# Patient Record
Sex: FEMALE | Race: BLACK OR AFRICAN AMERICAN | NOT HISPANIC OR LATINO | Employment: FULL TIME | URBAN - METROPOLITAN AREA
[De-identification: names, ages, dates, MRNs, and addresses within clinical notes are randomized per-mention and may not be internally consistent; named-entity substitution may affect disease eponyms.]

---

## 2020-05-11 ENCOUNTER — DOCUMENTATION ONLY (OUTPATIENT)
Dept: ALLERGY | Facility: CLINIC | Age: 35
End: 2020-05-11

## 2020-05-11 DIAGNOSIS — Z91.199 NO-SHOW FOR APPOINTMENT: Primary | ICD-10-CM

## 2020-05-11 NOTE — PROGRESS NOTES
No show initial appt 5/11/2020.        Jacque Zamora M.D.  Allergy/Immunology  [facility]   [phone] phone  [phone] fax

## 2020-07-27 ENCOUNTER — OFFICE VISIT (OUTPATIENT)
Dept: PODIATRY | Facility: CLINIC | Age: 35
End: 2020-07-27
Payer: COMMERCIAL

## 2020-07-27 VITALS — DIASTOLIC BLOOD PRESSURE: 79 MMHG | HEART RATE: 83 BPM | SYSTOLIC BLOOD PRESSURE: 159 MMHG | HEIGHT: 61 IN

## 2020-07-27 DIAGNOSIS — L60.8 MELANONYCHIA: Primary | ICD-10-CM

## 2020-07-27 PROCEDURE — 99202 OFFICE O/P NEW SF 15 MIN: CPT | Mod: S$GLB,,, | Performed by: PODIATRIST

## 2020-07-27 PROCEDURE — 99999 PR PBB SHADOW E&M-NEW PATIENT-LVL III: CPT | Mod: PBBFAC,,, | Performed by: PODIATRIST

## 2020-07-27 PROCEDURE — 99999 PR PBB SHADOW E&M-NEW PATIENT-LVL III: ICD-10-PCS | Mod: PBBFAC,,, | Performed by: PODIATRIST

## 2020-07-27 PROCEDURE — 99202 PR OFFICE/OUTPT VISIT, NEW, LEVL II, 15-29 MIN: ICD-10-PCS | Mod: S$GLB,,, | Performed by: PODIATRIST

## 2020-07-27 RX ORDER — HYDROCHLOROTHIAZIDE 12.5 MG/1
12.5 TABLET ORAL
COMMUNITY

## 2020-07-27 RX ORDER — ALBUTEROL SULFATE 90 UG/1
1 AEROSOL, METERED RESPIRATORY (INHALATION) EVERY 4 HOURS PRN
COMMUNITY
Start: 2020-06-05

## 2020-07-27 RX ORDER — GLYCOPYRRONIUM 2.4 G/100G
CLOTH TOPICAL
COMMUNITY
Start: 2020-05-05

## 2020-07-27 RX ORDER — OMEPRAZOLE 40 MG/1
CAPSULE, DELAYED RELEASE ORAL
COMMUNITY
Start: 2020-06-05

## 2020-07-27 NOTE — PATIENT INSTRUCTIONS
"  Longitudinal melanonychia, also called "melanonychia striata," describes a pigmented, brown to black, longitudinal streak of the nail plate due to increased activity of melanocytes or melanocytic hyperplasia in the nail matrix, with increased melanin deposition in the nail plate.     The most common type of longitudinal melanonychia due to melanocytic activation is ethnic melanonychia, which typically presents in dark-skinned individuals as multiple bands affecting multiple nails.       General nail care measures for abnormal nails include:    ? Keeping nails trimmed short    ? Avoiding trauma     ? Avoiding contact irritants     ? Keeping nails dry (avoiding wet work)    ? Avoiding all nail cosmetics     "

## 2020-07-27 NOTE — PROGRESS NOTES
Chief Complaint   Patient presents with    Nail Problem           HPI:   Marck Newell is a 34 y.o. female who presents to clinic with complaints of discolored toenails.  Patient states nails have been abnormal since at least 6 months.  Slowly getting worse.  However there are no wounds.  She reports no pain to the area.  She did start a new job about a year ago.  She wore clogs to work.  They were little tight so she stopped wearing them.  No self-treatment.        There is no problem list on file for this patient.          Current Outpatient Medications on File Prior to Visit   Medication Sig Dispense Refill    albuterol (PROVENTIL/VENTOLIN HFA) 90 mcg/actuation inhaler 1 puff every 4 (four) hours as needed.      hydroCHLOROthiazide (HYDRODIURIL) 12.5 MG Tab Take 12.5 mg by mouth.      omeprazole (PRILOSEC) 40 MG capsule       QBREXZA 2.4 % Towl        No current facility-administered medications on file prior to visit.             Review of patient's allergies indicates:  No Known Allergies        Social History     Socioeconomic History    Marital status: Single     Spouse name: Not on file    Number of children: Not on file    Years of education: Not on file    Highest education level: Not on file   Occupational History    Not on file   Social Needs    Financial resource strain: Not on file    Food insecurity     Worry: Not on file     Inability: Not on file    Transportation needs     Medical: Not on file     Non-medical: Not on file   Tobacco Use    Smoking status: Never Smoker    Smokeless tobacco: Never Used   Substance and Sexual Activity    Alcohol use: Not Currently    Drug use: Not on file    Sexual activity: Not Currently   Lifestyle    Physical activity     Days per week: Not on file     Minutes per session: Not on file    Stress: Not on file   Relationships    Social connections     Talks on phone: Not on file     Gets together: Not on file     Attends Christian service: Not on  "file     Active member of club or organization: Not on file     Attends meetings of clubs or organizations: Not on file     Relationship status: Not on file   Other Topics Concern    Not on file   Social History Narrative    Not on file             ROS:    General ROS: negative for - chills, fatigue or fever  Cardiovascular ROS: no chest pain or dyspnea on exertion  Musculoskeletal ROS: negative for - joint pain or joint stiffness   Skin: Negative for rash, Positive for nail or hair changes.  No itching.       EXAM:   Vitals:    07/27/20 1046   BP: (!) 159/79   BP Location: Right arm   Patient Position: Sitting   BP Method: Large (Automatic)   Pulse: 83   Height: 5' 1" (1.549 m)        General:  alert, no distress, cooperative    Vasc:    Pedal pulses: DP 2/4 - B/L and PT 2/4 - B/L  Capillary Refill Time: Normal - B/L  Temperature: Normal - B/L  Hair Growth: Normal - B/L  Edema:  Absent bilaterally      Neuro Motor:   Hudson present bilaterally,   Reflexes normal bilaterally,   Tinels absent  Mulders absent      Derm:   There is longitudinal melanonychia of the borders of the toenails bilateral, worse on the medial border bilateral 2nd toes.  It is in a symmetrical distribution.  There is no paronychia.  There is no redness.  There are no open wounds; no drainage noted.  There is no petechiae.  The soles of the feet or intact without vesicles redness or flaky skin.  Interdigital spaces are clean dry and intact without fissures.        Msk:    tenderness absent bilaterally,   masses absent bilaterally,   range of movement non-painful and equal,   crepitation absent bilaterally,   strength normal bilaterally and gait normal          ASSESSMENT / PLAN:     Problem List Items Addressed This Visit     None      Visit Diagnoses     Melanonychia    -  Primary            · I counseled the patient on the patient's conditions, their implications and medical management.   · Continue to monitor.   · Shoe and activity " modification as needed for relief.  Avoid tight fitting shoes, especially avoid a narrow toe box.    General nail care measures for abnormal nails include:    ? Keeping nails trimmed short    ? Avoiding trauma     ? Avoiding contact irritants     ? Keeping nails dry (avoiding wet work)    ? Avoiding all nail cosmetics       Call or return to clinic prn if these symptoms worsen or fail to improve as anticipated.

## 2020-08-05 ENCOUNTER — OFFICE VISIT (OUTPATIENT)
Dept: OBSTETRICS AND GYNECOLOGY | Facility: CLINIC | Age: 35
End: 2020-08-05
Payer: COMMERCIAL

## 2020-08-05 VITALS — BODY MASS INDEX: 42.04 KG/M2 | WEIGHT: 222.69 LBS | HEIGHT: 61 IN

## 2020-08-05 DIAGNOSIS — N92.0 MENORRHAGIA WITH REGULAR CYCLE: ICD-10-CM

## 2020-08-05 DIAGNOSIS — Z01.419 ENCOUNTER FOR GYNECOLOGICAL EXAMINATION WITHOUT ABNORMAL FINDING: Primary | ICD-10-CM

## 2020-08-05 LAB
B-HCG UR QL: NEGATIVE
CTP QC/QA: YES

## 2020-08-05 PROCEDURE — 88175 CYTOPATH C/V AUTO FLUID REDO: CPT

## 2020-08-05 PROCEDURE — 99385 PREV VISIT NEW AGE 18-39: CPT | Mod: S$GLB,,, | Performed by: OBSTETRICS & GYNECOLOGY

## 2020-08-05 PROCEDURE — 99385 PR PREVENTIVE VISIT,NEW,18-39: ICD-10-PCS | Mod: S$GLB,,, | Performed by: OBSTETRICS & GYNECOLOGY

## 2020-08-05 PROCEDURE — 87624 HPV HI-RISK TYP POOLED RSLT: CPT

## 2020-08-05 PROCEDURE — 99999 PR PBB SHADOW E&M-EST. PATIENT-LVL III: CPT | Mod: PBBFAC,,, | Performed by: OBSTETRICS & GYNECOLOGY

## 2020-08-05 PROCEDURE — 99999 PR PBB SHADOW E&M-EST. PATIENT-LVL III: ICD-10-PCS | Mod: PBBFAC,,, | Performed by: OBSTETRICS & GYNECOLOGY

## 2020-08-05 NOTE — PROGRESS NOTES
"CC: Well woman exam & problem    Marck Newell is a 35 y.o. female  presents for annual exam.  LMP: Patient's last menstrual period was 2020..        History reviewed. No pertinent past medical history.  Past Surgical History:   Procedure Laterality Date    HERNIA REPAIR       Social History     Socioeconomic History    Marital status: Single     Spouse name: Not on file    Number of children: Not on file    Years of education: Not on file    Highest education level: Not on file   Occupational History    Not on file   Social Needs    Financial resource strain: Not on file    Food insecurity     Worry: Not on file     Inability: Not on file    Transportation needs     Medical: Not on file     Non-medical: Not on file   Tobacco Use    Smoking status: Never Smoker    Smokeless tobacco: Never Used   Substance and Sexual Activity    Alcohol use: Not Currently    Drug use: Not on file    Sexual activity: Not Currently   Lifestyle    Physical activity     Days per week: Not on file     Minutes per session: Not on file    Stress: Not on file   Relationships    Social connections     Talks on phone: Not on file     Gets together: Not on file     Attends Confucianist service: Not on file     Active member of club or organization: Not on file     Attends meetings of clubs or organizations: Not on file     Relationship status: Not on file   Other Topics Concern    Not on file   Social History Narrative    Not on file     History reviewed. No pertinent family history.  OB History        0    Para   0    Term   0       0    AB   0    Living   0       SAB   0    TAB   0    Ectopic   0    Multiple   0    Live Births   0                 Ht 5' 1" (1.549 m)   Wt 101 kg (222 lb 10.6 oz)   LMP 2020   BMI 42.07 kg/m²       ROS:  GENERAL: Denies weight gain or weight loss. Feeling well overall.   SKIN: Denies rash or lesions.   HEAD: Denies head injury or headache.   NODES: Denies " enlarged lymph nodes.   CHEST: Denies chest pain or shortness of breath.   CARDIOVASCULAR: Denies palpitations or left sided chest pain.   ABDOMEN: No abdominal pain, constipation, diarrhea, nausea, vomiting or rectal bleeding.   URINARY: No frequency, dysuria, hematuria, or burning on urination.  REPRODUCTIVE: See below  BREASTS: The patient performs breast self-examination and denies pain, lumps, or nipple discharge.   HEMATOLOGIC: No easy bruisability or excessive bleeding.   MUSCULOSKELETAL: Denies joint pain or swelling.   NEUROLOGIC: Denies syncope or weakness.   PSYCHIATRIC: Denies depression, anxiety or mood swings.    PHYSICAL EXAM:  APPEARANCE: Well nourished, well developed, in no acute distress.  AFFECT: WNL, alert and oriented x 3  SKIN: No acne or hirsutism  NECK: Neck symmetric without masses or thyromegaly  NODES: No inguinal, cervical, axillary, or femoral lymph node enlargement  CHEST: Good respiratory effect  ABDOMEN: Soft.  No tenderness or masses.  No hepatosplenomegaly.  No hernias.  BREASTS: Symmetrical, no skin changes or visible lesions.  No palpable masses, nipple discharge bilaterally.  PELVIC: see below   EXTREMITIES: No edema.      PATIENT ALSO HERE FOR A PROBLEM:    Patient presents for irregular menses.   Long hx of irregular cycles occurring 4-6 times per year lasting 7 days - 9-10 weeks.   Since Feb 16th bleeding began and has continued since then using 4-5 pads per day, now overnight pads 3 times per day.   [facility] 12.1/37.1       PMH: above  PSH:  above  MEDICATIONS AND ALLERGIES: above  SH:  above    ROS:  See above  REPRODUCTIVE:   VULVA: No pain. No lesions. No itching.  VAGINA: No relaxation. No itching. No odor. No discharge. No lesions.      PE:  SEE ABOVE  PELVIC: External female genitalia without lesions.  Female hair distribution. Adequate perineal body, Normal urethral meatus. Vagina moist and well rugated without lesions or discharge.   Cervix pink without  lesions, discharge or tenderness. Uterus is normal size, regular, mobile and nontender. Adnexa without masses or tenderness.        ASSESSMENT    ICD-10-CM ICD-9-CM    1. Encounter for gynecological examination without abnormal finding  Z01.419 V72.31 Liquid-Based Pap Smear, Screening      HPV High Risk Genotypes, PCR      US Pelvis Comp with Transvag NON-OB (xpd   2. Menorrhagia with regular cycle  N92.0 626.2 POCT urine pregnancy      US Pelvis Comp with Transvag NON-OB (xpd         PLAN:  Encounter for gynecological examination without abnormal finding  -     Liquid-Based Pap Smear, Screening  -     HPV High Risk Genotypes, PCR  -     US Pelvis Comp with Transvag NON-OB (xpd; Future; Expected date: 08/05/2020    Menorrhagia with regular cycle  -     POCT urine pregnancy  -     US Pelvis Comp with Transvag NON-OB (xpd; Future; Expected date: 08/05/2020          COUNSELING:  Patient was counseled today on A.C.S. Pap guidelines and recommendations for yearly pelvic exams, mammograms and monthly self breast exams; to see her PCP for other health maintenance.     For EMBX

## 2020-08-06 ENCOUNTER — HOSPITAL ENCOUNTER (OUTPATIENT)
Dept: RADIOLOGY | Facility: OTHER | Age: 35
Discharge: HOME | End: 2020-08-06
Attending: OBSTETRICS & GYNECOLOGY
Payer: COMMERCIAL

## 2020-08-06 DIAGNOSIS — N92.0 MENORRHAGIA WITH REGULAR CYCLE: ICD-10-CM

## 2020-08-06 DIAGNOSIS — Z01.419 ENCOUNTER FOR GYNECOLOGICAL EXAMINATION WITHOUT ABNORMAL FINDING: ICD-10-CM

## 2020-08-06 PROCEDURE — 76830 TRANSVAGINAL US NON-OB: CPT | Mod: 26,,, | Performed by: RADIOLOGY

## 2020-08-06 PROCEDURE — 76830 US PELVIS COMP WITH TRANSVAG NON-OB (XPD): ICD-10-PCS | Mod: 26,,, | Performed by: RADIOLOGY

## 2020-08-06 PROCEDURE — 76856 US EXAM PELVIC COMPLETE: CPT | Mod: 26,,, | Performed by: RADIOLOGY

## 2020-08-06 PROCEDURE — 76856 US PELVIS COMP WITH TRANSVAG NON-OB (XPD): ICD-10-PCS | Mod: 26,,, | Performed by: RADIOLOGY

## 2020-08-06 PROCEDURE — 76830 TRANSVAGINAL US NON-OB: CPT | Mod: TC

## 2020-08-07 ENCOUNTER — TELEPHONE (OUTPATIENT)
Dept: OBSTETRICS AND GYNECOLOGY | Facility: CLINIC | Age: 35
End: 2020-08-07

## 2020-08-07 NOTE — TELEPHONE ENCOUNTER
----- Message from Apurva Sharma MD sent at 8/6/2020  6:09 PM CDT -----  Pelvic ultrasound reviewed.  Pelvic ultrasound within normal limits and unremarkable.  Please inform patient of results

## 2020-08-12 ENCOUNTER — TELEPHONE (OUTPATIENT)
Dept: OBSTETRICS AND GYNECOLOGY | Facility: CLINIC | Age: 35
End: 2020-08-12

## 2020-08-13 LAB
HPV HR 12 DNA SPEC QL NAA+PROBE: NEGATIVE
HPV16 AG SPEC QL: NEGATIVE
HPV18 DNA SPEC QL NAA+PROBE: NEGATIVE

## 2020-08-14 ENCOUNTER — TELEPHONE (OUTPATIENT)
Dept: OBSTETRICS AND GYNECOLOGY | Facility: CLINIC | Age: 35
End: 2020-08-14

## 2020-08-14 NOTE — TELEPHONE ENCOUNTER
----- Message from Hesham Mayen sent at 8/14/2020 12:35 PM CDT -----  Name of Who is Calling: pt    What is the request in detail: calling in regards to her test results. Please contact to further discuss and advise      Can the clinic reply by [facility]: no    [facility] [phone]

## 2020-08-21 LAB
FINAL PATHOLOGIC DIAGNOSIS: NORMAL
Lab: NORMAL

## 2021-04-16 ENCOUNTER — PATIENT MESSAGE (OUTPATIENT)
Dept: RESEARCH | Facility: HOSPITAL | Age: 36
End: 2021-04-16

## 2022-08-10 PROBLEM — R73.03 PREDIABETES: Status: ACTIVE | Noted: 2020-02-29

## 2022-08-10 PROBLEM — E78.5 HYPERLIPIDEMIA: Status: ACTIVE | Noted: 2018-08-02

## 2022-08-10 PROBLEM — I10 ESSENTIAL HYPERTENSION: Status: ACTIVE | Noted: 2020-02-29

## 2022-08-10 PROBLEM — Z98.84 HISTORY OF GASTRIC BYPASS: Status: ACTIVE | Noted: 2020-07-28

## 2022-08-10 PROBLEM — J45.909 ASTHMA: Status: ACTIVE | Noted: 2018-07-19
